# Patient Record
Sex: MALE | Race: WHITE
[De-identification: names, ages, dates, MRNs, and addresses within clinical notes are randomized per-mention and may not be internally consistent; named-entity substitution may affect disease eponyms.]

---

## 2017-12-19 ENCOUNTER — HOSPITAL ENCOUNTER (OUTPATIENT)
Dept: HOSPITAL 62 - RAD | Age: 67
End: 2017-12-19
Attending: FAMILY MEDICINE
Payer: MEDICARE

## 2017-12-19 DIAGNOSIS — M54.5: Primary | ICD-10-CM

## 2017-12-19 PROCEDURE — 72110 X-RAY EXAM L-2 SPINE 4/>VWS: CPT

## 2017-12-19 PROCEDURE — 72070 X-RAY EXAM THORAC SPINE 2VWS: CPT

## 2017-12-19 NOTE — RADIOLOGY REPORT (SQ)
EXAM DESCRIPTION:  L SPINE WHOLE



COMPLETED DATE/TIME:  12/19/2017 11:15 am



REASON FOR STUDY:   M54.5 LOW BACK PAIN M54.5  LOW BACK PAIN



COMPARISON:  None.



NUMBER OF VIEWS:  Five views including obliques.



TECHNIQUE:  AP, lateral, oblique, and sacral radiographic images acquired of the lumbar spine.



LIMITATIONS:  None.



FINDINGS:  MINERALIZATION: Osteopenia.

SEGMENTATION: Normal.  No transitional anatomy.

ALIGNMENT: Normal.

VERTEBRAE: Compression changes at L2 do not appear acute.  Marginal osteophytes are seen from L1-L4.

DISCS: There is mild narrowing of the L4-5 disc space

POSTERIOR ELEMENTS: Hypertrophic facet changes are present from L4-S1.

HARDWARE: None in the spine.

PARASPINAL SOFT TISSUES: Normal.

PELVIS: Intact as visualized. No fractures or worrisome bone lesions. SI joints intact.

OTHER: No other significant finding.



IMPRESSION:  Mild degenerative disc changes.  Spondylosis.  Facet arthropathy.  Old compression sandy
es at L2.



TECHNICAL DOCUMENTATION:  JOB ID:  [id number]

 2011 [employer]- All Rights Reserved

## 2017-12-19 NOTE — RADIOLOGY REPORT (SQ)
EXAM DESCRIPTION:  T SPINE AP/LAT



COMPLETED DATE/TIME:  12/19/2017 11:15 am



REASON FOR STUDY:   M54.5 LOW BACK PAIN M54.5  LOW BACK PAIN



COMPARISON:  None.



NUMBER OF VIEWS:  Two views.



TECHNIQUE:  AP and lateral radiographic images acquired of the thoracic spine.



LIMITATIONS:  The AP view does not include the lowest thoracic vertebrae.



FINDINGS:  MINERALIZATION: Normal.

ALIGNMENT: Normal.  No scoliosis.

VERTEBRAE: Mild wedge compression changes are seen at T8.  These do not appear to be acute.

DISCS: No significant loss of height or significant narrowing.  No large osteophytes.

HARDWARE: None in the spine.

MEDIASTINUM AND SOFT TISSUES: Normal heart size and aortic contour.  No soft tissue abnormality.

VISUALIZED LUNG ALMAZAN: Chronic interstitial changes are present.  Calcified pleural plaques are pres
ent.

OTHER: No other significant finding.



IMPRESSION:  There appear to be old compression changes at T8 as described.  Chronic lung and pleural
 changes.



TECHNICAL DOCUMENTATION:  JOB ID:  [id number]

 2011 [employer]- All Rights Reserved

## 2017-12-26 ENCOUNTER — HOSPITAL ENCOUNTER (OUTPATIENT)
Dept: HOSPITAL 62 - RAD | Age: 67
End: 2017-12-26
Attending: FAMILY MEDICINE
Payer: MEDICARE

## 2017-12-26 DIAGNOSIS — R22.2: ICD-10-CM

## 2017-12-26 DIAGNOSIS — K42.9: Primary | ICD-10-CM

## 2017-12-26 PROCEDURE — 76705 ECHO EXAM OF ABDOMEN: CPT

## 2018-01-02 ENCOUNTER — HOSPITAL ENCOUNTER (OUTPATIENT)
Dept: HOSPITAL 62 - RAD | Age: 68
End: 2018-01-02
Attending: FAMILY MEDICINE
Payer: MEDICARE
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
## 2018-01-02 DIAGNOSIS — S22.079A: ICD-10-CM

## 2018-01-02 DIAGNOSIS — M54.5: Primary | ICD-10-CM

## 2018-01-02 DIAGNOSIS — S22.089A: ICD-10-CM

## 2018-01-02 DIAGNOSIS — S32.029A: ICD-10-CM

## 2018-01-02 PROCEDURE — 72146 MRI CHEST SPINE W/O DYE: CPT

## 2018-01-02 PROCEDURE — 72148 MRI LUMBAR SPINE W/O DYE: CPT

## 2018-01-02 NOTE — RADIOLOGY REPORT (SQ)
EXAM DESCRIPTION:  MRI THORACIC SPINE WITHOUT



COMPLETED DATE/TIME:  1/2/2018 11:48 am



REASON FOR STUDY:  M54.5 LOW BACK PAIN M54.5  LOW BACK PAIN



COMPARISON:  None.



TECHNIQUE:  Sagittal and Axial imaging includes T1, T2, STIR and gradient echo sequences.



LIMITATIONS:  Motion.



FINDINGS:  LOCALIZER: No worrisome findings.

ALIGNMENT: Normal.

VERTEBRAE: Compression fracture T 9 approximately 50% height loss.  Compression fracture superior end
plate T12 approximately 30% height loss.

BONE MARROW: Subtle edema inferior endplate T9.  More diffuse edema T12.

HARDWARE: None in the spine.

CORD: Normal in size and signal intensity.

SOFT TISSUES: No soft tissue masses.

THORACIC DISCS T1-T12: No significant spinal stenosis or exit foraminal stenosis.

LOWER CERVICAL: Incompletely imaged. No significant spinal stenosis or exit foraminal stenosis.

UPPER LUMBAR: See separate report of the same date.

OTHER: No other significant finding.



IMPRESSION:  Recent compression fractures T9 and T12.



TECHNICAL DOCUMENTATION:  JOB ID:  [id number]

 2011 [employer]- All Rights Reserved

## 2018-01-02 NOTE — RADIOLOGY REPORT (SQ)
EXAM DESCRIPTION:  MRI LUMBAR SPINE WITHOUT



COMPLETED DATE/TIME:  1/2/2018 11:48 am



REASON FOR STUDY:  M54.5 LOW BACK PAIN M54.5  LOW BACK PAIN



COMPARISON:  None.



TECHNIQUE:  Sagittal and Axial imaging includes T1, T2, STIR and gradient echo sequences. Coronal T2/
HASTE imaging.



LIMITATIONS:  None.



FINDINGS:  VISUALIZED UPPER ABDOMEN:  Limited evaluation. No acute or suspicious findings suggested.

SEGMENTATION: No transitional anatomy. The lowest well-developed disc space is labeled L5-S1.

ALIGNMENT: Slight anterolisthesis of L2 relative to L1 and L3 relative to L2.  Mild scoliosis.

VERTEBRAE: Height loss inferior endplates of L1 and L2 approximately 50% height loss L1 and 75% heigh
t loss L2.  No significant retropulsion.

BONE MARROW: Subtle edema inferior endplates of L1 and L2.

DISC SIGNAL: Desiccation multiple levels.

POSTERIOR ELEMENTS:  Intact.

HARDWARE: None in the spine.

CORD AND CONUS: Normal in size and signal intensity. Conus at the appropriate level.

SOFT TISSUES: No aortic aneurysm seen. No bulky retroperitoneal adenopathy or mass. No paraspinal mas
s or fluid.

L1-L2: Mild spinal stenosis due to disc osteophyte complex.

L2-L3: Mild spinal stenosis due disc osteophyte complex.

L3-L4: No significant spinal stenosis or exit foraminal stenosis.

L4-L5: Facet arthropathy.  Mild neural foraminal narrowing bilaterally.

L5-S1: Facet arthropathy.  Mild neural foraminal narrowing bilaterally.

LOWER THORACIC: See separate report of the same date.

SACRUM: Visualized upper sacrum intact.

OTHER: No other significant findings.



IMPRESSION:  Recent compression fractures inferior endplates of L1 and L2.



TECHNICAL DOCUMENTATION:  JOB ID:  [id number]

 [employer]- All Rights Reserved

## 2018-01-11 ENCOUNTER — HOSPITAL ENCOUNTER (OUTPATIENT)
Dept: HOSPITAL 62 - WI | Age: 68
End: 2018-01-11
Attending: FAMILY MEDICINE
Payer: MEDICARE

## 2018-01-11 DIAGNOSIS — S22.070A: ICD-10-CM
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
## 2018-01-11 DIAGNOSIS — S32.020A: Primary | ICD-10-CM
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
## 2018-01-11 PROCEDURE — 77080 DXA BONE DENSITY AXIAL: CPT

## 2018-01-11 NOTE — WOMENS IMAGING REPORT
EXAM DESCRIPTION:  BONE DENSITY HIP/SPINE



COMPLETED DATE/TIME:  1/11/2018 11:01 am



REASON FOR STUDY:  COMPRESSION FX S32.020A  WEDGE COMPRESSION FRACTURE OF SECOND LUMBAR VERTEBR S22.0
70A  WEDGE COMPRESSION FRACTURE OF T9-T10 VERTEBRA, INIT



COMPARISON:   None.



TECHNIQUE:  Dual-Energy X-ray Absorptiometry (DEXA) of the AP Spine and Hip.



LIMITATIONS:  None.



FINDINGS:  LUMBAR SPINE:

The bone mineral density (BMD) measured from L1-L4 in the AP projection correlates with a T-score of 
-2.4, which is osteoporotic as defined by the World Health Organization.

HIP:

The bone mineral density (BMD) measured in the left femoral neck at the hip correlates with a T-score
 of -3.3, which is osteoporotic as defined by the World Health Organization.



IMPRESSION:  1. LUMBAR SPINE: Osteoporotic

2.  HIP: Osteoporotic



COMMENT:  The World Health Organization defines low BMD as follows:

T-score:

Normal:  Greater than -1.0

Osteopenia: Between -1.0 and -2.5

Osteoporosis:  Less than -2.5 without fractures

Established osteoporosis:  Less than -2.5 with fractures

In general, you may wish to consider:

Diagnosis          Treatment                     Follow-up DEXA

Normal BMD      Prevention                    2-3 years

Osteopenia       Prevention/Therapy        1-2 years

Osteoporosis     Therapy                        Yearly



TECHNICAL DOCUMENTATION:  JOB ID:  [id number]

 [employer]- All Rights Reserved

## 2018-02-27 ENCOUNTER — HOSPITAL ENCOUNTER (OUTPATIENT)
Dept: HOSPITAL 62 - RAD | Age: 68
End: 2018-02-27
Attending: FAMILY MEDICINE
Payer: MEDICARE

## 2018-02-27 DIAGNOSIS — Z13.6: Primary | ICD-10-CM

## 2018-02-27 DIAGNOSIS — Z12.2: ICD-10-CM

## 2018-02-27 DIAGNOSIS — R22.2: ICD-10-CM

## 2018-02-27 DIAGNOSIS — R05: ICD-10-CM

## 2018-02-27 DIAGNOSIS — R91.1: ICD-10-CM

## 2018-02-27 DIAGNOSIS — K43.9: ICD-10-CM

## 2018-02-27 DIAGNOSIS — Z77.090: ICD-10-CM

## 2018-02-27 PROCEDURE — 71250 CT THORAX DX C-: CPT

## 2018-02-27 PROCEDURE — 76706 US ABDL AORTA SCREEN AAA: CPT

## 2018-02-27 NOTE — RADIOLOGY REPORT (SQ)
EXAM DESCRIPTION:  U/S ABD AORTIC SCREENING



COMPLETED DATE/TIME:  2/27/2018 11:56 am



REASON FOR STUDY:  Z13.6 ENCOUNTER FOR SCREENING FOR CARDIOVASCULAR DISORDERS R05 COUGH Z13.6  ENCOUN
TER FOR SCREENING FOR CARDIOVASCULAR DISORDERS R05  COUGH



COMPARISON:  None.



TECHNIQUE:  Static and dynamic grayscale images acquired of the aorta and stored on PACs. Selected co
abby Doppler and spectral images recorded.



LIMITATIONS:  Overlying bowel gas.



FINDINGS:  AORTIC CALIBER MAXIMAL

PROXIMAL: 1.5 cm.

MID: 1.8 cm.

DISTAL: 1.4 cm.

Common iliac arteries are not visualized.



IMPRESSION:  NO ABDOMINAL AORTIC ANEURYSM.



COMMENT:  Aorta screening examinations categories:

Negative - less than 3 cm.



TECHNICAL DOCUMENTATION:  JOB ID:  [id number]

 2011 [employer]- All Rights Reserved



Reading location - IP/workstation name: [email or web address]

## 2018-02-27 NOTE — RADIOLOGY REPORT (SQ)
EXAM DESCRIPTION:  CT CHEST WITHOUT



COMPLETED DATE/TIME:  2/27/2018 11:54 am



REASON FOR STUDY:  Z13.6 ENCOUNTER FOR SCREENING FOR CARDIOVASCULAR DISORDERS R05 COUGH Z13.6  ENCOUN
TER FOR SCREENING FOR CARDIOVASCULAR DISORDERS R05  COUGH



COMPARISON:  None.



TECHNIQUE:  CT scan performed of the chest without intravenous contrast.  Images reviewed with lung, 
soft tissue and bone windows.  Reconstructed coronal and sagittal MPR images reviewed.  All images st
ored on PACS.

All CT scanners at this facility use dose modulation, iterative reconstruction, and/or weight based d
osing when appropriate to reduce radiation dose to as low as reasonably achievable (ALARA).

[facility]: Dose Right  [facility]: CareDose    [facility]: Dose Right    [facility]: Teradose 4D    [facility]: Smart Technologies



RADIATION DOSE:  CT Rad equipment meets quality standard of care and radiation dose reduction techniq
ues were employed. CTDIvol: 6.8 mGy. DLP: 281 mGy-cm. mGy.



LIMITATIONS:  No technical limitations.



FINDINGS:  LUNGS AND PLEURA: Calcified pleural plaques.  6 mm nodule right lower lobe.  This is too s
mall to characterize by PET-CT.  No effusions.

HILAR AND MEDIASTINAL STRUCTURES: No identified masses or abnormal nodes.  No obvious aneurysm.

HEART AND VASCULAR STRUCTURES: No aneurysm.  No pericardial effusion.

UPPER ABDOMEN: No significant findings.  Limited exam.

THYROID AND OTHER SOFT TISSUES: No masses.  No adenopathy.

BONES: Chronic appearing compression fractures of T8, T12, L1 and L2.

HARDWARE: None in the chest.

OTHER: No other significant findings.



IMPRESSION:  6 mm solid nodule right lower lobe.  Prior asbestos exposure.



COMMENT:   FLEISCHNER CRITERIA FOR FOLLOW-UP OF PULMONARY NODULES

Incidentally detected new nodules in persons 35 or older.

HIGH RISK: History of smoking or other known risk factors.

6-8mm single solid nodule: LOW RISK: CT 6-12 mo; then consider CT 18-24 mo. HIGH RISK: CT 6-12 mo; th
en CT 18-24 mo.



TECHNICAL DOCUMENTATION:  JOB ID:  [id number]

Quality ID # 436: Final reports with documentation of one or more dose reduction techniques (e.g., Au
tomated exposure control, adjustment of the mA and/or kV according to patient size, use of iterative 
reconstruction technique)

 2011 [employer]- All Rights Reserved



Reading location - IP/workstation name: [email or web address]

## 2018-08-23 ENCOUNTER — HOSPITAL ENCOUNTER (OUTPATIENT)
Dept: HOSPITAL 62 - RAD | Age: 68
End: 2018-08-23
Attending: FAMILY MEDICINE
Payer: MEDICARE

## 2018-08-23 DIAGNOSIS — R91.1: Primary | ICD-10-CM

## 2018-08-23 PROCEDURE — 71250 CT THORAX DX C-: CPT

## 2018-08-23 NOTE — RADIOLOGY REPORT (SQ)
EXAM DESCRIPTION:  CT CHEST WITHOUT



COMPLETED DATE/TIME:  8/23/2018 10:46 am



REASON FOR STUDY:  PULMONARY NODULES R91.1  SOLITARY PULMONARY NODULE



COMPARISON:  2/27/2018



TECHNIQUE:  CT scan performed of the chest without intravenous contrast.  Images reviewed with lung, 
soft tissue and bone windows.  Reconstructed coronal and sagittal MPR images reviewed.  All images st
ored on PACS.

All CT scanners at this facility use dose modulation, iterative reconstruction, and/or weight based d
osing when appropriate to reduce radiation dose to as low as reasonably achievable (ALARA).

[facility]: Dose Right  [facility]: CareDose    [facility]: Dose Right    [facility]: Teradose 4D    [facility]: Smart Technologies



RADIATION DOSE:   mGy.



LIMITATIONS:  No technical limitations.



FINDINGS:  LUNGS AND PLEURA: 6 mm right lower lobe pulmonary nodule is not significantly changed.  No
 new nodules.  Extensive calcified pleural plaques.  Centrilobular emphysema.  No effusions.

HILAR AND MEDIASTINAL STRUCTURES: No identified masses or abnormal nodes.  No obvious aneurysm.

HEART AND VASCULAR STRUCTURES: No aneurysm.  No pericardial effusion.

UPPER ABDOMEN: No significant findings.  Limited exam.

THYROID AND OTHER SOFT TISSUES: No masses.  No adenopathy.

BONES: No acute findings.  Chronic compression fractures.

HARDWARE: None in the chest.

OTHER: No other significant findings.



IMPRESSION:  Stable pulmonary nodule right lower lobe.



TECHNICAL DOCUMENTATION:  JOB ID:  [id number]

Quality ID # 436: Final reports with documentation of one or more dose reduction techniques (e.g., Au
tomated exposure control, adjustment of the mA and/or kV according to patient size, use of iterative 
reconstruction technique)

 2011 [employer]- All Rights Reserved



Reading location - IP/workstation name: [email or web address]

## 2019-01-23 ENCOUNTER — HOSPITAL ENCOUNTER (OUTPATIENT)
Dept: HOSPITAL 62 - OD | Age: 69
End: 2019-01-23
Attending: PHYSICIAN ASSISTANT
Payer: MEDICARE

## 2019-01-23 DIAGNOSIS — M25.512: Primary | ICD-10-CM

## 2019-01-23 DIAGNOSIS — M19.012: ICD-10-CM

## 2019-01-23 LAB
ADD MANUAL DIFF: NO
ALBUMIN SERPL-MCNC: 4.4 G/DL (ref 3.5–5)
ALP SERPL-CCNC: 64 U/L (ref 38–126)
ALT SERPL-CCNC: 22 U/L (ref 21–72)
ANION GAP SERPL CALC-SCNC: 7 MMOL/L (ref 5–19)
AST SERPL-CCNC: 16 U/L (ref 17–59)
BASOPHILS # BLD AUTO: 0 10^3/UL (ref 0–0.2)
BASOPHILS NFR BLD AUTO: 0.3 % (ref 0–2)
BILIRUB DIRECT SERPL-MCNC: 0.2 MG/DL (ref 0–0.4)
BILIRUB SERPL-MCNC: 0.9 MG/DL (ref 0.2–1.3)
BUN SERPL-MCNC: 11 MG/DL (ref 7–20)
CALCIUM: 9.6 MG/DL (ref 8.4–10.2)
CHLORIDE SERPL-SCNC: 101 MMOL/L (ref 98–107)
CO2 SERPL-SCNC: 29 MMOL/L (ref 22–30)
EOSINOPHIL # BLD AUTO: 0.1 10^3/UL (ref 0–0.6)
EOSINOPHIL NFR BLD AUTO: 1.1 % (ref 0–6)
ERYTHROCYTE [DISTWIDTH] IN BLOOD BY AUTOMATED COUNT: 14.7 % (ref 11.5–14)
ERYTHROCYTE [SEDIMENTATION RATE] IN BLOOD: 35 MM/HR (ref 0–20)
GLUCOSE SERPL-MCNC: 115 MG/DL (ref 75–110)
HCT VFR BLD CALC: 42.8 % (ref 37.9–51)
HGB BLD-MCNC: 14.8 G/DL (ref 13.5–17)
LYMPHOCYTES # BLD AUTO: 2.4 10^3/UL (ref 0.5–4.7)
LYMPHOCYTES NFR BLD AUTO: 18.8 % (ref 13–45)
MCH RBC QN AUTO: 30.9 PG (ref 27–33.4)
MCHC RBC AUTO-ENTMCNC: 34.5 G/DL (ref 32–36)
MCV RBC AUTO: 90 FL (ref 80–97)
MONOCYTES # BLD AUTO: 1.8 10^3/UL (ref 0.1–1.4)
MONOCYTES NFR BLD AUTO: 14.1 % (ref 3–13)
NEUTROPHILS # BLD AUTO: 8.3 10^3/UL (ref 1.7–8.2)
NEUTS SEG NFR BLD AUTO: 65.7 % (ref 42–78)
PLATELET # BLD: 301 10^3/UL (ref 150–450)
POTASSIUM SERPL-SCNC: 4.6 MMOL/L (ref 3.6–5)
PROT SERPL-MCNC: 7.1 G/DL (ref 6.3–8.2)
RBC # BLD AUTO: 4.77 10^6/UL (ref 4.35–5.55)
SODIUM SERPL-SCNC: 137.2 MMOL/L (ref 137–145)
TOTAL CELLS COUNTED % (AUTO): 100 %
URATE SERPL-MCNC: 3.2 MG/DL (ref 3.5–8.5)
WBC # BLD AUTO: 12.6 10^3/UL (ref 4–10.5)

## 2019-01-23 PROCEDURE — 84550 ASSAY OF BLOOD/URIC ACID: CPT

## 2019-01-23 PROCEDURE — 85652 RBC SED RATE AUTOMATED: CPT

## 2019-01-23 PROCEDURE — 85025 COMPLETE CBC W/AUTO DIFF WBC: CPT

## 2019-01-23 PROCEDURE — 80053 COMPREHEN METABOLIC PANEL: CPT

## 2019-01-23 PROCEDURE — 36415 COLL VENOUS BLD VENIPUNCTURE: CPT

## 2019-01-23 NOTE — RADIOLOGY REPORT (SQ)
EXAM DESCRIPTION:  SHOULDER LEFT 2 OR MORE VIEWS



COMPLETED DATE/TIME:  1/23/2019 12:07 pm



REASON FOR STUDY:  ACUTE PAIN OF LEFT SHOULDER M25.512  PAIN IN LEFT SHOULDER



COMPARISON:  None.



NUMBER OF VIEWS:  Three views.



TECHNIQUE:  Internal rotation, external rotation, and Y view images acquired of the left shoulder.



LIMITATIONS:  None.



FINDINGS:  MINERALIZATION: Normal.

BONES: No acute fracture or dislocation.  No worrisome bone lesions.

JOINTS: There is narrowing of the glenohumeral joint.

VISUALIZED LUNGS AND RIBS: No pneumothorax.  No rib fracture.

SOFT TISSUES: There are faint calcifications in the distal rotator cuff.

OTHER: No other significant finding.



IMPRESSION:  Glenohumeral degenerative joint changes.  Rotator cuff tendinosis.



TECHNICAL DOCUMENTATION:  JOB ID:  [id number]

 2011 [employer]- All Rights Reserved



Reading location - IP/workstation name: [email or web address]

## 2020-01-13 ENCOUNTER — HOSPITAL ENCOUNTER (OUTPATIENT)
Dept: HOSPITAL 62 - WI | Age: 70
End: 2020-01-13
Attending: INTERNAL MEDICINE
Payer: MEDICARE

## 2020-01-13 DIAGNOSIS — M81.0: Primary | ICD-10-CM

## 2020-01-13 PROCEDURE — 77080 DXA BONE DENSITY AXIAL: CPT

## 2020-10-28 ENCOUNTER — HOSPITAL ENCOUNTER (OUTPATIENT)
Dept: HOSPITAL 62 - RAD | Age: 70
End: 2020-10-28
Attending: FAMILY MEDICINE
Payer: MEDICARE

## 2020-10-28 DIAGNOSIS — Z77.090: ICD-10-CM

## 2020-10-28 DIAGNOSIS — J98.4: ICD-10-CM

## 2020-10-28 DIAGNOSIS — R91.1: Primary | ICD-10-CM

## 2020-10-28 PROCEDURE — 71250 CT THORAX DX C-: CPT

## 2020-10-28 NOTE — RADIOLOGY REPORT (SQ)
EXAM DESCRIPTION:  CT CHEST WITHOUT



IMAGES COMPLETED DATE/TIME:  10/28/2020 8:56 am



REASON FOR STUDY:  R91.1 SOLITARY PULMONARY NODULE R91.1  SOLITARY PULMONARY NODULE



COMPARISON:  8/23/2018



TECHNIQUE:  CT scan performed of the chest without intravenous contrast.  Images reviewed with lung, 
soft tissue and bone windows.  Reconstructed coronal and sagittal MPR images reviewed.  All images st
ored on PACS.

All CT scanners at this facility use dose modulation, iterative reconstruction, and/or weight based d
osing when appropriate to reduce radiation dose to as low as reasonably achievable (ALARA).

[facility]: Dose Right  [facility]: CareDose    [facility]: Dose Right    [facility]: Teradose 4D    [facility]: Smart Technologies



RADIATION DOSE:   mGy.



LIMITATIONS:  No technical limitations.



FINDINGS:  LUNGS AND PLEURA: Extensive calcified pleural plaque is again noted consistent with prior 
asbestosis exposure.  The nodule in the medial aspect of the right base best demonstrated on series 4
, image 104 now measures 8 mm in size.  This has slightly increased in size when compared to 2018.  N
o associated spiculation.

HILAR AND MEDIASTINAL STRUCTURES: No identified masses or abnormal nodes.  No obvious aneurysm.

HEART AND VASCULAR STRUCTURES: No aneurysm.  No pericardial effusion.

UPPER ABDOMEN: Stable fullness in the left adrenal gland most likely adenoma.  Hounsfield units measu
re -5.08.  There is a small right renal cyst.

THYROID AND OTHER SOFT TISSUES: No masses.  No adenopathy.

BONES: No significant finding.

HARDWARE: None in the chest.

OTHER: No other significant findings.



IMPRESSION:  1.  Right lower lobe pulmonary nodule has slightly increased in size.  It measures 8 mm 
in greatest diameter.  No spiculation.  The nodule has increased in size by approximately 1 mm since 
prior exam.

2. Extensive calcified pleural plaque consistent with prior asbestosis exposure.



COMMENT:   FLEISCHNER CRITERIA FOR FOLLOW-UP OF PULMONARY NODULES

Incidentally detected new nodules in persons 35 or older.

HIGH RISK: History of smoking or other known risk factors.

6-8 mm single solid nodule: LOW RISK: CT 6-12 mo; then consider CT 18-24 mo. HIGH RISK: CT 6-12 mo; t
hen CT 18-24 mo.



TECHNICAL DOCUMENTATION:  JOB ID:  [id number]

Quality ID # 436: Final reports with documentation of one or more dose reduction techniques (e.g., Au
tomated exposure control, adjustment of the mA and/or kV according to patient size, use of iterative 
reconstruction technique)

 2011 [employer]- All Rights Reserved



Reading location - IP/workstation name: [email or web address]

## 2022-11-04 NOTE — WOMENS IMAGING REPORT
EXAM DESCRIPTION:  BONE DENSITY HIP/SPINE



COMPLETED DATE/TIME:  1/13/2020 9:38 am



REASON FOR STUDY:  M81.0 BONE DENSITY M81.0  AGE-RELATED OSTEOPOROSIS W/O CURRENT PATHOLOGICAL FRAC



COMPARISON:   1/11/2018



TECHNIQUE:  Dual-Energy X-ray Absorptiometry (DEXA) of the AP Spine and Hip.



LIMITATIONS:  None.



FINDINGS:  LUMBAR SPINE:

The bone mineral density (BMD) measured from L1-L4 in the AP projection correlates with a T-score of 
-1.9, which is osteopenia as defined by the World Health Organization.  There has been a 6.5% improve
ment since baseline.

HIP:

The bone mineral density (BMD) measured in the left hip correlates with a T-score of -3.5, which is o
steoporosis as defined by the World Health Organization.  There has been a -0.7% chain since baseline
.



IMPRESSION:  1. LUMBAR SPINE: OSTEOPENIA.

2.  HIP: OSTEOPOROSIS.



COMMENT:  The World Health Organization defines low BMD as follows:

T-score:

Normal:  Greater than -1.0

Osteopenia: Between -1.0 and -2.5

Osteoporosis:  Less than -2.5 without fractures

Established osteoporosis:  Less than -2.5 with fractures

In general, you may wish to consider:

Diagnosis          Treatment                     Follow-up DEXA

Normal BMD      Prevention                    2-3 years

Osteopenia       Prevention/Therapy        1-2 years

Osteoporosis     Therapy                        Yearly



TECHNICAL DOCUMENTATION:  JOB ID:  [id number]

 2011 [employer]- All Rights Reserved



Reading location - IP/workstation name: [email or web address] Alert and interactive, no focal deficits